# Patient Record
Sex: MALE
[De-identification: names, ages, dates, MRNs, and addresses within clinical notes are randomized per-mention and may not be internally consistent; named-entity substitution may affect disease eponyms.]

---

## 2018-09-22 ENCOUNTER — HOSPITAL ENCOUNTER (EMERGENCY)
Dept: HOSPITAL 25 - UCEAST | Age: 23
Discharge: HOME | End: 2018-09-22
Payer: SELF-PAY

## 2018-09-22 VITALS — SYSTOLIC BLOOD PRESSURE: 120 MMHG | DIASTOLIC BLOOD PRESSURE: 73 MMHG
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
## 2018-09-22 DIAGNOSIS — S01.01XD: Primary | ICD-10-CM
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
## 2018-09-22 PROCEDURE — G0463 HOSPITAL OUTPT CLINIC VISIT: HCPCS

## 2018-09-22 PROCEDURE — 99211 OFF/OP EST MAY X REQ PHY/QHP: CPT
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
